# Patient Record
Sex: MALE | Race: WHITE | NOT HISPANIC OR LATINO | RURAL
[De-identification: names, ages, dates, MRNs, and addresses within clinical notes are randomized per-mention and may not be internally consistent; named-entity substitution may affect disease eponyms.]

---

## 2021-08-03 ENCOUNTER — OFFICE VISIT (OUTPATIENT)
Dept: RURAL CLINIC 6 | Facility: CLINIC | Age: 61
End: 2021-08-03
Payer: MEDICARE

## 2021-08-03 ENCOUNTER — WEB ENCOUNTER (OUTPATIENT)
Dept: RURAL CLINIC 2 | Facility: CLINIC | Age: 61
End: 2021-08-03

## 2021-08-03 DIAGNOSIS — K21.00 GASTROESOPHAGEAL REFLUX DISEASE WITH ESOPHAGITIS WITHOUT HEMORRHAGE: ICD-10-CM

## 2021-08-03 DIAGNOSIS — R05 CHRONIC COUGH: ICD-10-CM

## 2021-08-03 PROCEDURE — 99203 OFFICE O/P NEW LOW 30 MIN: CPT | Performed by: INTERNAL MEDICINE

## 2021-08-03 RX ORDER — ESOMEPRAZOLE MAGNESIUM 40 MG/1
1 CAPSULE BEFORE A MEAL TWICE A DAY FOR 4 WEEKS THEN EACH AM CAPSULE, DELAYED RELEASE ORAL TWICE A DAY
Qty: 90 | Refills: 2 | OUTPATIENT

## 2021-08-03 RX ORDER — TRAMADOL HYDROCHLORIDE 50 MG/1
TAKE 2 TABLETS BY ORAL ROUTE EVERY 4 HOURS TABLET ORAL
Qty: 0 | Refills: 0 | Status: DISCONTINUED | COMMUNITY
Start: 1900-01-01

## 2021-08-03 RX ORDER — INSULIN DEGLUDEC INJECTION 200 U/ML
AS DIRECTED INJECTION, SOLUTION SUBCUTANEOUS
Status: ACTIVE | COMMUNITY

## 2021-08-03 RX ORDER — ISOSORBIDE MONONITRATE 30 MG/1
1 TABLET IN THE MORNING TABLET, EXTENDED RELEASE ORAL ONCE A DAY
Status: DISCONTINUED | COMMUNITY

## 2021-08-03 RX ORDER — ATORVASTATIN CALCIUM 40 MG/1
1 TABLET TABLET, FILM COATED ORAL 1
Refills: 0 | Status: ACTIVE | COMMUNITY
Start: 1900-01-01

## 2021-08-03 RX ORDER — BENZONATATE 100 MG/1
1 CAPSULE AS NEEDED CAPSULE ORAL THREE TIMES A DAY
Status: DISCONTINUED | COMMUNITY

## 2021-08-03 RX ORDER — IPRATROPIUM BROMIDE AND ALBUTEROL SULFATE .5; 2.5 MG/3ML; MG/3ML
3 ML AS NEEDED SOLUTION RESPIRATORY (INHALATION)
Status: DISCONTINUED | COMMUNITY

## 2021-08-03 RX ORDER — FUROSEMIDE 20 MG/1
1 TABLET TABLET ORAL ONCE A DAY
Status: ACTIVE | COMMUNITY

## 2021-08-03 RX ORDER — TAMSULOSIN HYDROCHLORIDE 0.4 MG/1
1 CAPSULE CAPSULE ORAL ONCE A DAY
Status: DISCONTINUED | COMMUNITY

## 2021-08-03 RX ORDER — GABAPENTIN 600 MG/1
1 TABLET TABLET, FILM COATED ORAL
Refills: 0 | Status: ACTIVE | COMMUNITY
Start: 1900-01-01

## 2021-08-03 RX ORDER — ALLOPURINOL 100 MG/1
1 TABLET TABLET ORAL ONCE A DAY
Status: ACTIVE | COMMUNITY

## 2021-08-03 RX ORDER — INSULIN ASPART 100 [IU]/ML
INJECT BY SUBCUTANEOUS ROUTE PER PRESCRIBER'S INSTRUCTIONS. INSULIN DOSING REQUIRES INDIVIDUALIZATION INJECTION, SOLUTION INTRAVENOUS; SUBCUTANEOUS
Qty: 1 | Refills: 0 | Status: DISCONTINUED | COMMUNITY
Start: 1900-01-01

## 2021-08-03 RX ORDER — NITROGLYCERIN 0.4 MG/1
AS DIRECTED TABLET SUBLINGUAL
Status: ACTIVE | COMMUNITY

## 2021-08-03 RX ORDER — CLOPIDOGREL 75 MG/1
TAKE 1 TABLET (75 MG) BY ORAL ROUTE ONCE DAILY TABLET ORAL 1
Qty: 0 | Refills: 0 | Status: ACTIVE | COMMUNITY
Start: 1900-01-01

## 2021-08-03 RX ORDER — METFORMIN HYDROCHLORIDE 1000 MG/1
TAKE 1 TABLET (1,000 MG) BY ORAL ROUTE 2 TIMES PER DAY WITH MORNING AND EVENING MEALS TABLET, COATED ORAL 2
Qty: 0 | Refills: 0 | Status: DISCONTINUED | COMMUNITY
Start: 1900-01-01

## 2021-08-03 RX ORDER — HYDROCODONE BITARTRATE AND ACETAMINOPHEN 5; 325 MG/1; MG/1
TAKE 1 TABLET BY ORAL ROUTE EVERY 6 HOURS AS NEEDED FOR PAIN TABLET ORAL
Qty: 0 | Refills: 0 | Status: ACTIVE | COMMUNITY
Start: 1900-01-01

## 2021-08-03 RX ORDER — BRIMONIDINE TARTRATE, TIMOLOL MALEATE 2; 5 MG/ML; MG/ML
SOLUTION/ DROPS OPHTHALMIC
Qty: 0 | Refills: 0 | Status: ACTIVE | COMMUNITY
Start: 1900-01-01

## 2021-08-03 RX ORDER — ASPIRIN 81 MG/1
TAKE 1 TABLET (81 MG) BY ORAL ROUTE ONCE DAILY TABLET, COATED ORAL 1
Qty: 0 | Refills: 0 | Status: ACTIVE | COMMUNITY
Start: 1900-01-01

## 2021-08-03 RX ORDER — AMITRIPTYLINE HYDROCHLORIDE 10 MG/1
1 TABLET AT BEDTIME TABLET, FILM COATED ORAL ONCE A DAY
Status: ACTIVE | COMMUNITY

## 2021-08-03 RX ORDER — METOPROLOL SUCCINATE 50 MG/1
1 TABLET TABLET, FILM COATED, EXTENDED RELEASE ORAL ONCE A DAY
Status: ACTIVE | COMMUNITY

## 2021-08-03 RX ORDER — METOPROLOL TARTRATE 25 MG/1
TAKE 1 TABLET (25 MG) BY ORAL ROUTE 2 TIMES PER DAY TABLET, FILM COATED ORAL 2
Qty: 0 | Refills: 0 | Status: ACTIVE | COMMUNITY
Start: 1900-01-01

## 2021-08-03 RX ORDER — MUPIROCIN 20 MG/G
APPLY A SMALL AMOUNT TO THE AFFECTED AREA BY TOPICAL ROUTE 3 TIMES PER DAY OINTMENT TOPICAL
Qty: 1 | Refills: 0 | Status: DISCONTINUED | COMMUNITY
Start: 1900-01-01

## 2021-08-03 RX ORDER — INSULIN HUMAN 500 [IU]/ML
AS DIRECTED INJECTION, SOLUTION SUBCUTANEOUS
Status: ACTIVE | COMMUNITY

## 2021-08-03 RX ORDER — AMLODIPINE BESYLATE 5 MG/1
TAKE 0.5 TABLET BY ORAL ROUTE 2 TIMES A DAY TABLET ORAL 2
Qty: 0 | Refills: 0 | Status: DISCONTINUED | COMMUNITY
Start: 1900-01-01

## 2021-08-03 RX ORDER — INSULIN GLARGINE 100 [IU]/ML
INJECTION, SOLUTION SUBCUTANEOUS
Qty: 0 | Refills: 0 | Status: DISCONTINUED | COMMUNITY
Start: 1900-01-01

## 2021-08-03 RX ORDER — CYANOCOBALAMIN (VITAMIN B-12) 1000 MCG
1 TABLET TABLET ORAL ONCE A DAY
Status: DISCONTINUED | COMMUNITY

## 2021-08-03 NOTE — HPI-TODAY'S VISIT:
60-year-old male referred by Dr. Hoffman for chronic cough.  He is unaware of acid reflux and does not take acid blockers.  He had an upper endoscopy in 2016 with findings of severe erosive esophagitis without San's and with no H. pylori on gastric biopsy.  He denies anemia dysphagia disturbed bowel function or obvious GI bleeding.  He has never had a colonoscopy.  He does not take medications commonly associated with chronic cough.  He denies ear problems.  He denies respiratory problems or productive cough.  He does not smoke. History of MI in recent past with a stent placed 2 months ago.  No current chest pain or heart failure symptoms.  He is on Plavix and aspirin. No prior history of colonoscopy.

## 2021-08-03 NOTE — PHYSICAL EXAM CONSTITUTIONAL:
well developed, obese in no acute distress , ambulating without difficulty , normal communication ability

## 2021-10-21 ENCOUNTER — DASHBOARD ENCOUNTERS (OUTPATIENT)
Age: 61
End: 2021-10-21

## 2021-12-21 ENCOUNTER — OFFICE VISIT (OUTPATIENT)
Dept: RURAL CLINIC 6 | Facility: CLINIC | Age: 61
End: 2021-12-21

## 2021-12-21 RX ORDER — ASPIRIN 81 MG/1
TAKE 1 TABLET (81 MG) BY ORAL ROUTE ONCE DAILY TABLET, COATED ORAL 1
Qty: 0 | Refills: 0 | COMMUNITY
Start: 1900-01-01

## 2021-12-21 RX ORDER — ATORVASTATIN CALCIUM 40 MG/1
1 TABLET TABLET, FILM COATED ORAL 1
Refills: 0 | COMMUNITY
Start: 1900-01-01

## 2021-12-21 RX ORDER — GABAPENTIN 600 MG/1
1 TABLET TABLET, FILM COATED ORAL
Refills: 0 | COMMUNITY
Start: 1900-01-01

## 2021-12-21 RX ORDER — NITROGLYCERIN 0.4 MG/1
AS DIRECTED TABLET SUBLINGUAL
COMMUNITY

## 2021-12-21 RX ORDER — ESOMEPRAZOLE MAGNESIUM 40 MG/1
1 CAPSULE BEFORE A MEAL TWICE A DAY FOR 4 WEEKS THEN EACH AM CAPSULE, DELAYED RELEASE ORAL TWICE A DAY
Qty: 90 | Refills: 2 | COMMUNITY

## 2021-12-21 RX ORDER — FUROSEMIDE 20 MG/1
1 TABLET TABLET ORAL ONCE A DAY
COMMUNITY

## 2021-12-21 RX ORDER — CLOPIDOGREL 75 MG/1
TAKE 1 TABLET (75 MG) BY ORAL ROUTE ONCE DAILY TABLET ORAL 1
Qty: 0 | Refills: 0 | COMMUNITY
Start: 1900-01-01

## 2021-12-21 RX ORDER — INSULIN DEGLUDEC INJECTION 200 U/ML
AS DIRECTED INJECTION, SOLUTION SUBCUTANEOUS
COMMUNITY

## 2021-12-21 RX ORDER — METOPROLOL SUCCINATE 50 MG/1
1 TABLET TABLET, FILM COATED, EXTENDED RELEASE ORAL ONCE A DAY
COMMUNITY

## 2021-12-21 RX ORDER — ALLOPURINOL 100 MG/1
1 TABLET TABLET ORAL ONCE A DAY
COMMUNITY

## 2021-12-21 RX ORDER — BRIMONIDINE TARTRATE, TIMOLOL MALEATE 2; 5 MG/ML; MG/ML
SOLUTION/ DROPS OPHTHALMIC
Qty: 0 | Refills: 0 | COMMUNITY
Start: 1900-01-01

## 2021-12-21 RX ORDER — INSULIN HUMAN 500 [IU]/ML
AS DIRECTED INJECTION, SOLUTION SUBCUTANEOUS
COMMUNITY

## 2021-12-21 RX ORDER — AMITRIPTYLINE HYDROCHLORIDE 10 MG/1
1 TABLET AT BEDTIME TABLET, FILM COATED ORAL ONCE A DAY
COMMUNITY

## 2021-12-21 RX ORDER — HYDROCODONE BITARTRATE AND ACETAMINOPHEN 5; 325 MG/1; MG/1
TAKE 1 TABLET BY ORAL ROUTE EVERY 6 HOURS AS NEEDED FOR PAIN TABLET ORAL
Qty: 0 | Refills: 0 | COMMUNITY
Start: 1900-01-01

## 2021-12-21 RX ORDER — METOPROLOL TARTRATE 25 MG/1
TAKE 1 TABLET (25 MG) BY ORAL ROUTE 2 TIMES PER DAY TABLET, FILM COATED ORAL 2
Qty: 0 | Refills: 0 | COMMUNITY
Start: 1900-01-01